# Patient Record
Sex: FEMALE | Race: BLACK OR AFRICAN AMERICAN | NOT HISPANIC OR LATINO | Employment: FULL TIME | URBAN - METROPOLITAN AREA
[De-identification: names, ages, dates, MRNs, and addresses within clinical notes are randomized per-mention and may not be internally consistent; named-entity substitution may affect disease eponyms.]

---
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
## 2025-05-29 ENCOUNTER — OFFICE VISIT (OUTPATIENT)
Dept: FAMILY MEDICINE CLINIC | Facility: CLINIC | Age: 20
End: 2025-05-29
Payer: COMMERCIAL

## 2025-05-29 VITALS
TEMPERATURE: 97 F | SYSTOLIC BLOOD PRESSURE: 124 MMHG | HEIGHT: 68 IN | OXYGEN SATURATION: 98 % | DIASTOLIC BLOOD PRESSURE: 88 MMHG | WEIGHT: 292.8 LBS | HEART RATE: 72 BPM | BODY MASS INDEX: 44.38 KG/M2

## 2025-05-29 DIAGNOSIS — R11.0 NAUSEA: ICD-10-CM

## 2025-05-29 DIAGNOSIS — F43.10 PTSD (POST-TRAUMATIC STRESS DISORDER): ICD-10-CM

## 2025-05-29 DIAGNOSIS — Z00.00 ENCOUNTER FOR ANNUAL PHYSICAL EXAM: Primary | ICD-10-CM

## 2025-05-29 DIAGNOSIS — F41.9 ANXIETY: ICD-10-CM

## 2025-05-29 DIAGNOSIS — F33.2 SEVERE EPISODE OF RECURRENT MAJOR DEPRESSIVE DISORDER, WITHOUT PSYCHOTIC FEATURES: ICD-10-CM

## 2025-05-29 RX ORDER — SERTRALINE HYDROCHLORIDE 25 MG/1
25 TABLET, FILM COATED ORAL DAILY
Qty: 30 TABLET | Refills: 0 | Status: SHIPPED | OUTPATIENT
Start: 2025-05-29 | End: 2025-05-29

## 2025-05-29 RX ORDER — PRAZOSIN HYDROCHLORIDE 1 MG/1
1-2 CAPSULE ORAL NIGHTLY
Qty: 60 CAPSULE | Refills: 0 | Status: SHIPPED | OUTPATIENT
Start: 2025-05-29 | End: 2025-06-28

## 2025-05-29 RX ORDER — ONDANSETRON 4 MG/1
4 TABLET, FILM COATED ORAL EVERY 8 HOURS PRN
Qty: 30 TABLET | Refills: 2 | Status: SHIPPED | OUTPATIENT
Start: 2025-05-29

## 2025-05-29 RX ORDER — OMEPRAZOLE 40 MG/1
40 CAPSULE, DELAYED RELEASE ORAL DAILY
Qty: 90 CAPSULE | Refills: 0 | Status: SHIPPED | OUTPATIENT
Start: 2025-05-29

## 2025-05-29 RX ORDER — SERTRALINE HYDROCHLORIDE 25 MG/1
25 TABLET, FILM COATED ORAL NIGHTLY
Qty: 30 TABLET | Refills: 0 | Status: SHIPPED | OUTPATIENT
Start: 2025-05-29

## 2025-05-29 NOTE — PROGRESS NOTES
Chief Complaint  Annual Exam and Med Refill (Wants to get back on lexapro )    Subjective        Cheryle Carcamo presents to [facility] for annual exam     History of Present Illness  Significant other's mother present with patient today.    Anxiety/depression  Started lexapro 10 MG on 02/16/24.  Stopped probably 1 year ago.  Moved out of state, came back.  Reports a lot of  things have happened since last visit 08/29/24- did not want to go into details.  S/o mother was emotional and became tearful during appt.  She is currently staying with her brother, getting her own place soon.  Reports the lexapro Was helpful but almost too helpful, couldn't feel emotions.   Hydroxyzine would like it again for sleep maybe.  Reports she Has nightmares almost every night.  Some suicidal thoughts, passive ideas.  No plans in place for self harm currently.  Last self harm episode; 1-2 weeks ago; cut her self on legs, instead of arms.  Working on finding psych and therapist currently, declined referrals.                   PHQ-9 Depression Screening  Little interest or pleasure in doing things? More than half the days   Feeling down, depressed, or hopeless? Nearly every day   PHQ-2 Total Score 5   Trouble falling or staying asleep, or sleeping too much? Nearly every day   Feeling tired or having little energy? Nearly every day   Poor appetite or overeating? Nearly every day   Feeling bad about yourself - or that you are a failure or have let yourself or your family down? Nearly every day   Trouble concentrating on things, such as reading the newspaper or watching television? More than half the days   Moving or speaking so slowly that other people could have noticed? Or the opposite - being so fidgety or restless that you have been moving around a lot more than usual? Not at all     Thoughts that you would be better off dead, or of hurting yourself in some way? Several days   PHQ-9 Total Score 20   If  you checked off any problems, how difficult have these problems made it for you to do your work, take care of things at home, or get along with other people? Somewhat difficult           LIYA-7 Anxiety Screening  Feeling nervous, anxious or on edge? Nearly every day   Not being able to stop or control worrying? Nearly every day   Worrying too much about different things? Nearly every day   Trouble relaxing? Several days   Being so restless that it is hard to sit still? Nearly every day   Becoming easily annoyed or irritable? Nearly every day   Feeling afraid as if something awful might happen? Nearly every day   LIYA-7 Total Score 19   If you checked any problems, how difficult have these problems made it for you to do your work, take care of things at home, or get along with other people? Somewhat difficult           Nausea  Wakes up very nasueated and sick almost daily.  Reports Acid reflux in the past.            Past Medical History:   Diagnosis Date    Anxiety     Depression     Low back pain         History reviewed. No pertinent surgical history.     Family History   Problem Relation Age of Onset    Anxiety disorder Mother     Depression Mother     ADD / ADHD Brother     Anxiety disorder Brother                        Social History     Socioeconomic History    Marital status: Single   Tobacco Use    Smoking status: Never     Passive exposure: Never    Smokeless tobacco: Never   Vaping Use    Vaping status: Never Used   Substance and Sexual Activity    Alcohol use: Not Currently    Drug use: Yes     Types: Marijuana    Sexual activity: Not Currently     Partners: Female     Birth control/protection: Partner of same sex         Social history:    Work status: [employer]  Tobacco use: denies  Alcohol use: social  Illicit drugs [facility]           Health Maintenance  Dentist: several years ago.  Vision: wears glasses, last eye exam 1 year ago.  HPV unsure  Annual today  MB unsure  Hep C wait  COVID  "denies                                  Objective   Vital Signs:  /88   Pulse 72   Temp 97 °F (36.1 °C) (Temporal)   Ht 172.7 cm (67.99\")   Wt 133 kg (292 lb 12.8 oz)   SpO2 98%   BMI 44.53 kg/m²   Estimated body mass index is 44.53 kg/m² as calculated from the following:    Height as of this encounter: 172.7 cm (67.99\").    Weight as of this encounter: 133 kg (292 lb 12.8 oz).    Pediatric BMI = >99 %ile (Z= 2.58, 141% of 95%ile) based on CDC (Girls, 2-20 Years) BMI-for-age based on BMI available on 5/29/2025..       Physical Exam  Vitals reviewed.   Constitutional:       General: She is not in acute distress.     Appearance: Normal appearance.   HENT:      Head: Normocephalic and atraumatic.   Eyes:      Conjunctiva/sclera: Conjunctivae normal.      Pupils: Pupils are equal, round, and reactive to light.   Cardiovascular:      Rate and Rhythm: Normal rate and regular rhythm.      Pulses: Normal pulses.      Heart sounds: No murmur heard.     No gallop.   Pulmonary:      Effort: Pulmonary effort is normal. No respiratory distress.      Breath sounds: Normal breath sounds. No wheezing.   Abdominal:      General: Bowel sounds are normal. There is no distension.      Palpations: Abdomen is soft.      Tenderness: There is no abdominal tenderness.   Musculoskeletal:         General: Normal range of motion.      Cervical back: Normal range of motion and neck supple. No tenderness.      Right lower leg: No edema.      Left lower leg: No edema.   Skin:     General: Skin is warm and dry.   Neurological:      Mental Status: She is alert and oriented to person, place, and time. Mental status is at baseline.   Psychiatric:         Mood and Affect: Mood is anxious.        Result Review :                Assessment and Plan   Diagnoses and all orders for this visit:    1. Encounter for annual physical exam (Primary)    2. Anxiety  -     Discontinue: sertraline (Zoloft) 25 MG tablet; Take 1 tablet by mouth Daily.  " Dispense: 30 tablet; Refill: 0  -     prazosin (MINIPRESS) 1 MG capsule; Take 1-2 capsules by mouth Every Night for 30 days.  Dispense: 60 capsule; Refill: 0  -     sertraline (Zoloft) 25 MG tablet; Take 1 tablet by mouth Every Night.  Dispense: 30 tablet; Refill: 0    3. Severe episode of recurrent major depressive disorder, without psychotic features  -     Discontinue: sertraline (Zoloft) 25 MG tablet; Take 1 tablet by mouth Daily.  Dispense: 30 tablet; Refill: 0  -     prazosin (MINIPRESS) 1 MG capsule; Take 1-2 capsules by mouth Every Night for 30 days.  Dispense: 60 capsule; Refill: 0  -     sertraline (Zoloft) 25 MG tablet; Take 1 tablet by mouth Every Night.  Dispense: 30 tablet; Refill: 0    4. PTSD (post-traumatic stress disorder)  -     Discontinue: sertraline (Zoloft) 25 MG tablet; Take 1 tablet by mouth Daily.  Dispense: 30 tablet; Refill: 0  -     prazosin (MINIPRESS) 1 MG capsule; Take 1-2 capsules by mouth Every Night for 30 days.  Dispense: 60 capsule; Refill: 0  -     sertraline (Zoloft) 25 MG tablet; Take 1 tablet by mouth Every Night.  Dispense: 30 tablet; Refill: 0    5. Nausea  -     omeprazole (priLOSEC) 40 MG capsule; Take 1 capsule by mouth Daily.  Dispense: 90 capsule; Refill: 0  -     ondansetron (Zofran) 4 MG tablet; Take 1 tablet by mouth Every 8 (Eight) Hours As Needed for Nausea or Vomiting.  Dispense: 30 tablet; Refill: 2          Refused labs today, patient does not do well with lab draws.  Would like to wait on therapist and psych, wants to make sure her insurance is active.  Discussed new medications and s/e.  Will see how she tolerates zoloft instead of lexapro.   Due to frequent nightmares and difficulty sleeping, we will try prazosin instead of hydroxyzine.         We discussed plans for emergent help if patient starts having increased mood changes/aggression, inability to control emotions, fears for her/his or others safety, or thoughts of suicidal and homicidal ideations. In  case of emergency, call 911. Patient may go to [facility],  [facility], or [facility] for immediate psychiatric evaluation and 72 hour hold.  Patient may go to [place] at anytime for free evaluation by clinical worker. Patient provided with current list of different psychiatrics, counselors, resources available and suicide hotline number.         Encourage healthy diet and exercise.  Encourage patient to stay up to date on screening examinations as indicated based on age and risk factors.       Follow Up   Return in about 4 weeks (around 6/26/2025) for Video visit.  Patient was given instructions and counseling regarding her condition or for health maintenance advice. Please see specific information pulled into the AVS if appropriate.

## 2025-05-29 NOTE — PATIENT INSTRUCTIONS
We discussed plans for emergent help if patient starts having increased mood changes/aggression, inability to control emotions, fears for her/his or others safety, or thoughts of suicidal and homicidal ideations. In case of emergency, call 911. Patient may go to [facility],  [facility], or [facility] for immediate psychiatric evaluation and 72 hour hold.  Patient may go to [place] at anytime for free evaluation by clinical worker. Patient provided with current list of different psychiatrics, counselors, resources available and suicide hotline number.

## 2025-06-01 PROBLEM — F43.10 PTSD (POST-TRAUMATIC STRESS DISORDER): Status: ACTIVE | Noted: 2025-06-01

## 2025-06-26 ENCOUNTER — TELEMEDICINE (OUTPATIENT)
Dept: FAMILY MEDICINE CLINIC | Facility: CLINIC | Age: 20
End: 2025-06-26
Payer: COMMERCIAL

## 2025-06-26 DIAGNOSIS — F41.9 ANXIETY: ICD-10-CM

## 2025-06-26 DIAGNOSIS — F43.10 PTSD (POST-TRAUMATIC STRESS DISORDER): ICD-10-CM

## 2025-06-26 DIAGNOSIS — F33.2 SEVERE EPISODE OF RECURRENT MAJOR DEPRESSIVE DISORDER, WITHOUT PSYCHOTIC FEATURES: ICD-10-CM

## 2025-06-26 PROCEDURE — 1159F MED LIST DOCD IN RCRD: CPT

## 2025-06-26 PROCEDURE — 99213 OFFICE O/P EST LOW 20 MIN: CPT

## 2025-06-26 PROCEDURE — 1160F RVW MEDS BY RX/DR IN RCRD: CPT

## 2025-06-26 RX ORDER — PRAZOSIN HYDROCHLORIDE 1 MG/1
1-2 CAPSULE ORAL NIGHTLY
Qty: 60 CAPSULE | Refills: 0 | Status: SHIPPED | OUTPATIENT
Start: 2025-06-26 | End: 2025-07-26
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
## 2025-06-30 ENCOUNTER — TELEPHONE (OUTPATIENT)
Dept: FAMILY MEDICINE CLINIC | Facility: CLINIC | Age: 20
End: 2025-06-30
Payer: COMMERCIAL

## 2025-06-30 NOTE — TELEPHONE ENCOUNTER
The patient was calling about paperwork that states that her dog is a service animal. She said that she never received paperwork from her apartment. She believes it is just a letter that is needed. She said she discussed it with Melanie at her most recent appointment. Please call the patient when the letter is ready at [phone].

## 2025-07-23 ENCOUNTER — TELEMEDICINE (OUTPATIENT)
Dept: FAMILY MEDICINE CLINIC | Facility: CLINIC | Age: 20
End: 2025-07-23
Payer: COMMERCIAL

## 2025-07-23 DIAGNOSIS — F43.10 PTSD (POST-TRAUMATIC STRESS DISORDER): ICD-10-CM

## 2025-07-23 DIAGNOSIS — F41.9 ANXIETY: ICD-10-CM

## 2025-07-23 DIAGNOSIS — F33.2 SEVERE EPISODE OF RECURRENT MAJOR DEPRESSIVE DISORDER, WITHOUT PSYCHOTIC FEATURES: ICD-10-CM

## 2025-07-23 PROCEDURE — 1160F RVW MEDS BY RX/DR IN RCRD: CPT

## 2025-07-23 PROCEDURE — 99213 OFFICE O/P EST LOW 20 MIN: CPT

## 2025-07-23 PROCEDURE — 1159F MED LIST DOCD IN RCRD: CPT

## 2025-07-23 RX ORDER — PRAZOSIN HYDROCHLORIDE 1 MG/1
1-2 CAPSULE ORAL NIGHTLY
Qty: 180 CAPSULE | Refills: 0 | Status: SHIPPED | OUTPATIENT
Start: 2025-07-23 | End: 2025-10-21
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
## 2025-08-05 DIAGNOSIS — F33.2 SEVERE EPISODE OF RECURRENT MAJOR DEPRESSIVE DISORDER, WITHOUT PSYCHOTIC FEATURES: ICD-10-CM

## 2025-08-05 DIAGNOSIS — F41.9 ANXIETY: ICD-10-CM
